# Patient Record
Sex: FEMALE | NOT HISPANIC OR LATINO | URBAN - METROPOLITAN AREA
[De-identification: names, ages, dates, MRNs, and addresses within clinical notes are randomized per-mention and may not be internally consistent; named-entity substitution may affect disease eponyms.]

---

## 2022-12-08 ENCOUNTER — OFFICE VISIT (OUTPATIENT)
Dept: URBAN - METROPOLITAN AREA CLINIC 23 | Facility: CLINIC | Age: 42
End: 2022-12-08
Payer: COMMERCIAL

## 2022-12-08 DIAGNOSIS — K51.20 ULCERATIVE PROCTITIS: ICD-10-CM

## 2022-12-08 PROCEDURE — 99204 OFFICE O/P NEW MOD 45 MIN: CPT | Performed by: INTERNAL MEDICINE

## 2022-12-08 RX ORDER — MESALAMINE 1000 MG/1
1 SUPPOSITORY AT BEDTIME SUPPOSITORY RECTAL ONCE A DAY
Status: ACTIVE | COMMUNITY

## 2022-12-08 NOTE — HPI-TODAY'S VISIT:
42F h/o ulcerative proctitis diagnosed 2021 Colon 2021--inflammation in rectum. bx showed ulcerative proctitis. rest of the colon and TI normal with normal bx Pt uses Canasa supp which keep her symptoms under control she was told to repeat colon in 1 year. she is here to schedule repeat colon

## 2023-01-24 ENCOUNTER — OFFICE VISIT (OUTPATIENT)
Dept: URBAN - METROPOLITAN AREA CLINIC 12 | Facility: CLINIC | Age: 43
End: 2023-01-24

## 2023-03-08 ENCOUNTER — TELEPHONE ENCOUNTER (OUTPATIENT)
Dept: URBAN - METROPOLITAN AREA CLINIC 92 | Facility: CLINIC | Age: 43
End: 2023-03-08

## 2023-03-08 ENCOUNTER — OFFICE VISIT (OUTPATIENT)
Dept: URBAN - METROPOLITAN AREA LAB 1 | Facility: LAB | Age: 43
End: 2023-03-08
Payer: COMMERCIAL

## 2023-03-08 DIAGNOSIS — K92.1 ACUTE MELENA: ICD-10-CM

## 2023-03-08 DIAGNOSIS — K63.89 APPENDICITIS EPIPLOICA: ICD-10-CM

## 2023-03-08 DIAGNOSIS — K52.89 (LYMPHOCYTIC) MICROSCOPIC COLITIS: ICD-10-CM

## 2023-03-08 PROBLEM — 52231000 ULCERATIVE PROCTITIS: Status: ACTIVE | Noted: 2022-12-08

## 2023-03-08 PROCEDURE — 45380 COLONOSCOPY AND BIOPSY: CPT | Performed by: INTERNAL MEDICINE

## 2023-03-08 RX ORDER — MESALAMINE 1000 MG/1
1 SUPPOSITORY AT BEDTIME SUPPOSITORY RECTAL ONCE A DAY
Status: ACTIVE | COMMUNITY

## 2023-03-16 ENCOUNTER — LAB OUTSIDE AN ENCOUNTER (OUTPATIENT)
Dept: URBAN - METROPOLITAN AREA CLINIC 23 | Facility: CLINIC | Age: 43
End: 2023-03-16

## 2023-03-19 ENCOUNTER — TELEPHONE ENCOUNTER (OUTPATIENT)
Dept: URBAN - METROPOLITAN AREA CLINIC 35 | Facility: CLINIC | Age: 43
End: 2023-03-19

## 2023-03-19 LAB
ALBUMIN LEVEL: 4.6
ALK PHOS: 87
ALT: 16
AST: 18
BILIRUBIN DIRECT: 0.1
BILIRUBIN TOTAL: 0.4
HEP A AB IGM: (no result)
HEP B CORE AB TOTAL: (no result)
HEP B SURF AB: (no result)
HEP B SURF AG: (no result)
HEP C AB: (no result)
PERFORMING LAB: (no result)
PERFORMING LAB: (no result)
PROTEIN TOTAL: 7.5

## 2023-03-24 LAB
PERFORMING LAB: (no result)
QUANTIFERON-TB PLUS, 1T: (no result)

## 2023-04-12 ENCOUNTER — WEB ENCOUNTER (OUTPATIENT)
Dept: URBAN - METROPOLITAN AREA CLINIC 111 | Facility: CLINIC | Age: 43
End: 2023-04-12

## 2023-04-18 ENCOUNTER — OFFICE VISIT (OUTPATIENT)
Dept: URBAN - METROPOLITAN AREA CLINIC 111 | Facility: CLINIC | Age: 43
End: 2023-04-18
Payer: COMMERCIAL

## 2023-04-18 ENCOUNTER — DASHBOARD ENCOUNTERS (OUTPATIENT)
Age: 43
End: 2023-04-18

## 2023-04-18 VITALS
HEIGHT: 61 IN | HEART RATE: 73 BPM | SYSTOLIC BLOOD PRESSURE: 117 MMHG | WEIGHT: 163.8 LBS | DIASTOLIC BLOOD PRESSURE: 66 MMHG | BODY MASS INDEX: 30.93 KG/M2

## 2023-04-18 DIAGNOSIS — K51.20 ULCERATIVE PROCTITIS: ICD-10-CM

## 2023-04-18 PROCEDURE — 99213 OFFICE O/P EST LOW 20 MIN: CPT | Performed by: INTERNAL MEDICINE

## 2023-04-18 RX ORDER — MESALAMINE 1000 MG/1
1 SUPPOSITORY AT BEDTIME SUPPOSITORY RECTAL ONCE A DAY
Status: ACTIVE | COMMUNITY

## 2023-04-18 NOTE — HPI-TODAY'S VISIT:
42F here for f/u after colon. FASP h/o ulcerative proctitis diagnosed 2021 Colon 2021--inflammation in rectum. bx showed ulcerative proctitis. 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 rest of the colon and TI are nromal Pt uses Canasa supp every night which keep her symptoms under control. BM 2, formed a day. no blood

## 2023-06-20 ENCOUNTER — TELEPHONE ENCOUNTER (OUTPATIENT)
Dept: URBAN - METROPOLITAN AREA CLINIC 96 | Facility: CLINIC | Age: 43
End: 2023-06-20